# Patient Record
Sex: MALE | Race: OTHER
[De-identification: names, ages, dates, MRNs, and addresses within clinical notes are randomized per-mention and may not be internally consistent; named-entity substitution may affect disease eponyms.]

---

## 2019-10-03 ENCOUNTER — HOSPITAL ENCOUNTER (EMERGENCY)
Dept: HOSPITAL 56 - MW.ED | Age: 29
Discharge: HOME | End: 2019-10-03
Payer: COMMERCIAL

## 2019-10-03 DIAGNOSIS — L03.115: ICD-10-CM

## 2019-10-03 DIAGNOSIS — L03.116: Primary | ICD-10-CM

## 2019-10-03 PROCEDURE — 99283 EMERGENCY DEPT VISIT LOW MDM: CPT

## 2019-10-03 PROCEDURE — 82962 GLUCOSE BLOOD TEST: CPT

## 2019-10-03 PROCEDURE — 96372 THER/PROPH/DIAG INJ SC/IM: CPT

## 2019-10-03 NOTE — EDM.PDOC
ED HPI GENERAL MEDICAL PROBLEM





- General


Chief Complaint: Skin Complaint


Stated Complaint: RASH ON LEG


Time Seen by Provider: 10/03/19 15:01


Source of Information: Reports: Patient


History Limitations: Reports: No Limitations





- History of Present Illness


INITIAL COMMENTS - FREE TEXT/NARRATIVE: 





History of present illness:


[]Patient started having redness and tenderness on his right leg 4 days ago in 

clinic and started on Keflex yesterday. Today the redness worsened and he noted 

lesions on his left leg. Patient has no fevers, chills, chest pain or shortness 

of breath. She denies any trauma to his legs.





Review of systems: 


As per history of present illness and below otherwise all systems reviewed and 

negative.





Past medical history: 


As per history of present illness and as reviewed below otherwise 

noncontributory.





Surgical history: 


As per history of present illness and as reviewed below otherwise 

noncontributory.





Social history: 


No reported history of drug or alcohol abuse.





Family history: 


As per history of present illness and as reviewed below otherwise 

noncontributory.





Physical exam:


General: Well developed, well nourished in NAD


HEENT: Atraumatic, normocephalic, pupils reactive, negative for conjunctival 

pallor or scleral icterus, mucous membranes moist, throat clear, neck supple, 

nontender, trachea midline.


Lungs: Clear to auscultation, breath sounds equal bilaterally, chest nontender.


Heart: S1S2, regular, negative for clicks, rubs, or JVD.


Abdomen: NABS, Soft, nondistended, nontender. Negative for masses or 

hepatosplenomegaly. Negative for costovertebral tenderness.


Pelvis: Stable nontender.


Genitourinary: Deferred.


Rectal: Deferred.


Extremities: Atraumatic, positive erythema over medial right leg with small 

areas of erythema on the left medial leg. There are no open, weeping wounds. 

negative for cords or calf pain. Neurovascular unremarkable.


Neuro: Awake, alert, oriented. Cranial nerves II through XII unremarkable. 

Cerebellum unremarkable. Motor and sensory unremarkable throughout. Exam 

nonfocal.


Skin:warm and dry





Diagnostics:


None





Therapeutics:


Ceftriaxone IM





ED Course:


Stable





Impression: 


Cellulitis bilateral legs





Prescriptions:


Levaquin





Plan:


Follow-up with PMD, return if symptoms worsen or change





Definitive disposition and diagnosis as appropriate pending reevaluation and 

review of above.


  ** Bilateral Lower Leg


Pain Score (Numeric/FACES): 5





- Related Data


 Allergies











Allergy/AdvReac Type Severity Reaction Status Date / Time


 


No Known Allergies Allergy   Verified 10/03/19 15:00











Home Meds: 


 Home Meds





Cephalexin [Keflex] 500 mg PO ASDIRECTED 10/03/19 [History]


levoFLOXacin [Levaquin] 500 mg PO DAILY #14 tab 10/03/19 [Rx]











Past Medical History





- Past Health History


Medical/Surgical History: Denies Medical/Surgical History





- Infectious Disease History


Infectious Disease History: Reports: None





Social & Family History





- Family History


Family Medical History: Noncontributory





- Tobacco Use


Smoking Status *Q: Never Smoker


Second Hand Smoke Exposure: No





- Caffeine Use


Caffeine Use: Reports: Coffee





- Recreational Drug Use


Recreational Drug Use: No





ED ROS GENERAL





- Review of Systems


Review Of Systems: See Below





ED EXAM, SKIN/RASH


Exam: See Below





Course





- Vital Signs


Last Recorded V/S: 


 Last Vital Signs











Temp  97.7 F   10/03/19 16:25


 


Pulse  104 H  10/03/19 16:25


 


Resp  16   10/03/19 15:01


 


BP  154/89 H  10/03/19 16:25


 


Pulse Ox  99   10/03/19 16:25














- Orders/Labs/Meds


Orders: 


 Active Orders 24 hr











 Category Date Time Status


 


 Blood Glucose Check, Bedside [RC] ONETIME Care  10/03/19 16:00 Active











Labs: 


 Laboratory Tests











  10/03/19 Range/Units





  15:57 


 


POC Glucose  85  ()  mg/dL











Meds: 


Medications














Discontinued Medications














Generic Name Dose Route Start Last Admin





  Trade Name Freq  PRN Reason Stop Dose Admin


 


Ceftriaxone Sodium 1,000 mg/  1 mls @ 1 mls/sec  10/03/19 15:40  10/03/19 15:52





  Lidocaine HCl  IM  10/03/19 15:41  1 mls/sec





  ONETIME ONE   Administration





     





     





     





     














Departure





- Departure


Time of Disposition: 16:30


Disposition: Home, Self-Care 01


Clinical Impression: 


 Cellulitis








- Discharge Information


*PRESCRIPTION DRUG MONITORING PROGRAM REVIEWED*: No


*COPY OF PRESCRIPTION DRUG MONITORING REPORT IN PATIENT LUCERO: No


Prescriptions: 


levoFLOXacin [Levaquin] 500 mg PO DAILY #14 tab


Instructions:  Cellulitis, Adult, Easy-to-Read


Referrals: 


Adonis Sullivan MD [Resident] - 10/18/19 2:00 pm (Please check in 30 mins 

before apt. )


Forms:  ED Department Discharge


Additional Instructions: 


The following information is given to patients seen in the emergency department 

who are being discharged to home. This information is to outline your options 

for follow-up care. We provide all patients seen in our emergency department 

with a follow-up referral.





The need for follow-up, as well as the timing and circumstances, are variable 

depending upon the specifics of your emergency department visit.





If you don't have a primary care physician on staff, we will provide you with a 

referral. We always advise you to contact your personal physician following an 

emergency department visit to inform them of the circumstance of the visit and 

for follow-up with them and/or the need for any referrals to a consulting 

specialist.





The emergency department will also refer you to a specialist when appropriate. 

This referral assures that you have the opportunity for follow-up care with a 

specialist. All of these measure are taken in an effort to provide you with 

optimal care, which includes your follow-up.





Under all circumstances we always encourage you to contact your private 

physician who remains a resource for coordinating your care. When calling for 

follow-up care, please make the office aware that this follow-up is from your 

recent emergency room visit. If for any reason you are refused follow-up, 

please contact the [facility] Emergency 

Department at [phone] and asked to speak to the emergency department 

charge nurse.





Take meds as directed, follow up with your primary care physician, return to ER 

if symptoms worsen or change.





[facility]


Primary Care


[address] [place] [id number]


Phone: [phone]


Fax: [phone]





- My Orders


Last 24 Hours: 


My Active Orders





10/03/19 16:00


Blood Glucose Check, Bedside [RC] ONETIME 














- Assessment/Plan


Last 24 Hours: 


My Active Orders





10/03/19 16:00


Blood Glucose Check, Bedside [RC] ONETIME

## 2019-10-05 ENCOUNTER — HOSPITAL ENCOUNTER (OUTPATIENT)
Dept: HOSPITAL 56 - MW.ED | Age: 29
Setting detail: OBSERVATION
LOS: 2 days | Discharge: HOME | End: 2019-10-07
Attending: INTERNAL MEDICINE | Admitting: INTERNAL MEDICINE
Payer: COMMERCIAL

## 2019-10-05 DIAGNOSIS — F17.210: ICD-10-CM

## 2019-10-05 DIAGNOSIS — L03.116: Primary | ICD-10-CM

## 2019-10-05 DIAGNOSIS — E66.01: ICD-10-CM

## 2019-10-05 DIAGNOSIS — R73.03: ICD-10-CM

## 2019-10-05 DIAGNOSIS — D72.825: ICD-10-CM

## 2019-10-05 DIAGNOSIS — L03.115: ICD-10-CM

## 2019-10-05 DIAGNOSIS — G47.33: ICD-10-CM

## 2019-10-05 LAB
BUN SERPL-MCNC: 15 MG/DL (ref 7–18)
CHLORIDE SERPL-SCNC: 101 MMOL/L (ref 98–107)
CO2 SERPL-SCNC: 30.7 MMOL/L (ref 21–32)
GLUCOSE SERPL-MCNC: 104 MG/DL (ref 74–106)
POTASSIUM SERPL-SCNC: 3.8 MMOL/L (ref 3.5–5.1)
SODIUM SERPL-SCNC: 139 MMOL/L (ref 136–148)

## 2019-10-05 PROCEDURE — 96366 THER/PROPH/DIAG IV INF ADDON: CPT

## 2019-10-05 PROCEDURE — 87040 BLOOD CULTURE FOR BACTERIA: CPT

## 2019-10-05 PROCEDURE — 96372 THER/PROPH/DIAG INJ SC/IM: CPT

## 2019-10-05 PROCEDURE — 83880 ASSAY OF NATRIURETIC PEPTIDE: CPT

## 2019-10-05 PROCEDURE — 80053 COMPREHEN METABOLIC PANEL: CPT

## 2019-10-05 PROCEDURE — 99285 EMERGENCY DEPT VISIT HI MDM: CPT

## 2019-10-05 PROCEDURE — 96365 THER/PROPH/DIAG IV INF INIT: CPT

## 2019-10-05 PROCEDURE — 93005 ELECTROCARDIOGRAM TRACING: CPT

## 2019-10-05 PROCEDURE — 71045 X-RAY EXAM CHEST 1 VIEW: CPT

## 2019-10-05 PROCEDURE — 83036 HEMOGLOBIN GLYCOSYLATED A1C: CPT

## 2019-10-05 PROCEDURE — 83605 ASSAY OF LACTIC ACID: CPT

## 2019-10-05 PROCEDURE — 96376 TX/PRO/DX INJ SAME DRUG ADON: CPT

## 2019-10-05 PROCEDURE — 93970 EXTREMITY STUDY: CPT

## 2019-10-05 PROCEDURE — 96375 TX/PRO/DX INJ NEW DRUG ADDON: CPT

## 2019-10-05 PROCEDURE — G0378 HOSPITAL OBSERVATION PER HR: HCPCS

## 2019-10-05 PROCEDURE — 81003 URINALYSIS AUTO W/O SCOPE: CPT

## 2019-10-05 PROCEDURE — 85025 COMPLETE CBC W/AUTO DIFF WBC: CPT

## 2019-10-05 PROCEDURE — 82962 GLUCOSE BLOOD TEST: CPT

## 2019-10-05 PROCEDURE — 36415 COLL VENOUS BLD VENIPUNCTURE: CPT

## 2019-10-05 PROCEDURE — 80202 ASSAY OF VANCOMYCIN: CPT

## 2019-10-05 NOTE — EDM.PDOC
ED HPI GENERAL MEDICAL PROBLEM





- General


Chief Complaint: General


Stated Complaint: BOTH FEET SWOLLEN


Time Seen by Provider: 10/05/19 21:31





- History of Present Illness


INITIAL COMMENTS - FREE TEXT/NARRATIVE: 





HISTORY AND PHYSICAL:





History of present illness:


The patient is a 29-year-old male who was seen here 2 days ago for concern 

about cellulitis to his legs bilaterally and had been previously seen in our 

clinic and started on Keflex. On that evaluation the patient was changed to 

Levaquin for antibiotics and he says he has been compliant. He says that the 

redness has improved but he is concerned because now bilateral feet and lower 

legs are swollen. He says it is discomforting but he has no chest pain or 

shortness of breath no abdominal pain no urinary symptoms and no other new 

rashes or issues. He says he has not had a fever and no trauma to his legs. He 

says that he has not been doing a lot of walking on them but he is also not 

been elevating them. He has no weakness or neurosensory changes in his legs and 

feet.





Review of systems: 


As per history of present illness and below otherwise all systems reviewed and 

negative.





Past medical history: 


As per history of present illness and as reviewed below otherwise 

noncontributory.





Surgical history: 


As per history of present illness and as reviewed below otherwise 

noncontributory.





Social history: 


No reported history of drug or alcohol abuse.





Family history: 


As per history of present illness and as reviewed below otherwise 

noncontributory.





Physical exam:


General: Well-developed well-nourished very overweight man who is nontoxic and 

not breathless on my evaluation. Vital signs are noted by me.


HEENT: Atraumatic, normocephalic,  negative for conjunctival pallor or scleral 

icterus, mucous membranes moist, throat clear, neck supple, nontender, trachea 

midline.


Lungs: Clear to auscultation but diminished breath sounds in the bases 

bilaterally, no wheezing stridor or work of breathing, breath sounds equal 

bilaterally, chest nontender.


Heart: S1S2, regular, negative for clicks, rubs, or JVD.


Abdomen: Soft, nondistended, nontender. Abdomen is very rotund but there is no 

tenderness rebound or guarding and bowel sounds are hypoactive Negative for 

masses or hepatosplenomegaly. Negative for costovertebral tenderness.


Pelvis: Stable nontender.


Genitourinary: Deferred.


Rectal: Deferred.


Extremities: Atraumatic, negative for cords or calf pain. Neurovascular 

unremarkable. Legs are symmetrical and have diffuse soft tissue edema starting 

below the knee and extending to the toes and there is erythema bilaterally 

right greater than left without any skin lesions or breaks.


Neuro: Awake, alert, oriented. Cranial nerves II through XII unremarkable. 

Cerebellum unremarkable. Motor and sensory unremarkable throughout. Exam 

nonfocal.





Diagnostics:


EKG CBC CMP BNP UA with reflex chest x-ray bilateral lower extremity Dopplers 

blood cultures 2 lactic acid





Therapeutics:


Toradol vancomycin





I discussed all testing results with the patient and family at bedside and I 

have marked the redness on the legs bilaterally. I will give the patient a dose 

of vancomycin here and I have offered the patient observation admission as is a 

WBC count is elevated. I have offered the alternative of going home after the 

vancomycin and adding a second oral antibiotic to his regimen and have him come 

back in the next 12-24 hours for repeat blood work.





Impression: 


Persistent cellulitis bilateral lower extremities, leukocytosis





Definitive disposition and diagnosis as appropriate pending reevaluation and 

review of above.


  ** Left Foot


Pain Score (Numeric/FACES): 5





- Related Data


 Allergies











Allergy/AdvReac Type Severity Reaction Status Date / Time


 


No Known Allergies Allergy   Verified 10/05/19 21:26











Home Meds: 


 Home Meds





levoFLOXacin [Levaquin] 500 mg PO DAILY #14 tab 10/03/19 [Rx]











Past Medical History





- Past Health History


Medical/Surgical History: Denies Medical/Surgical History





- Infectious Disease History


Infectious Disease History: Reports: None





Social & Family History





- Family History


Family Medical History: Noncontributory





- Tobacco Use


Smoking Status *Q: Light Tobacco Smoker


Years of Tobacco use: 5


Packs/Tins Daily: 0.1





- Caffeine Use


Caffeine Use: Reports: None





- Recreational Drug Use


Recreational Drug Use: No





ED ROS GENERAL





- Review of Systems


Review Of Systems: ROS reveals no pertinent complaints other than HPI.





ED EXAM, GENERAL





- Physical Exam


Exam: See Below (See dictation)





Course





- Vital Signs


Last Recorded V/S: 


 Last Vital Signs











Temp  35.7 C   10/05/19 21:22


 


Pulse  108 H  10/05/19 22:46


 


Resp  20   10/05/19 22:46


 


BP  141/89 H  10/05/19 22:46


 


Pulse Ox  96   10/05/19 22:46














- Orders/Labs/Meds


Orders: 


 Active Orders 24 hr











 Category Date Time Status


 


 EKG Documentation Completion [RC] STAT Care  10/05/19 21:39 Active


 


 CULTURE BLOOD [BC] Stat Lab  10/05/19 22:52 Ordered


 


 CULTURE BLOOD [BC] Stat Lab  10/05/19 23:40 Received


 


 Sodium Chloride 0.9% [Saline Flush] Med  10/05/19 22:12 Active





 10 ml FLUSH ASDIRECTED PRN   


 


 Sodium Chloride 0.9% [Saline Flush] Med  10/05/19 22:12 Active





 2.5 ml FLUSH ASDIRECTED PRN   


 


 Vancomycin 1 gm Med  10/05/19 23:56 Ordered





 Sodium Chloride 0.9% [Normal Saline (AdvBag)] 250 ml   





 IV ONETIME   


 


 Blood Culture x2 Reflex Set [OM.PC] Stat Oth  10/05/19 22:52 Ordered


 


 Saline Lock Insert [OM.PC] Stat Oth  10/05/19 22:12 Ordered








 Medication Orders





Sodium Chloride (Saline Flush)  10 ml FLUSH ASDIRECTED PRN


   PRN Reason: Keep Vein Open


Sodium Chloride (Saline Flush)  2.5 ml FLUSH ASDIRECTED PRN


   PRN Reason: Keep Vein Open








Labs: 


 Laboratory Tests











  10/05/19 10/05/19 10/05/19 Range/Units





  22:05 22:05 22:05 


 


WBC  16.99 H    (4.0-11.0)  K/uL


 


RBC  5.11    (4.50-5.90)  M/uL


 


Hgb  13.3    (13.0-17.0)  g/dL


 


Hct  41.2    (38.0-50.0)  %


 


MCV  80.6    (80.0-98.0)  fL


 


MCH  26.0 L    (27.0-32.0)  pg


 


MCHC  32.3    (31.0-37.0)  g/dL


 


RDW Std Deviation  45.0    (28.0-62.0)  fl


 


RDW Coeff of Estephania  15    (11.0-15.0)  %


 


Plt Count  451 H    (150-400)  K/uL


 


MPV  9.80    (7.40-12.00)  fL


 


Add Manual Diff  YES    


 


Neutrophils % (Manual)  62    (48.0-80.0)  %


 


Lymphocytes % (Manual)  26    (16.0-40.0)  %


 


Monocytes % (Manual)  4    (0.0-15.0)  %


 


Eosinophils % (Manual)  7    (0.0-7.0)  %


 


Basophils % (Manual)  1    (0.0-1.5)  %


 


Nucleated RBC %  0.0    /100WBC


 


Absolute Seg Neuts  10.5 H    (1.4-5.7)  


 


Lymphocytes # (Manual)  4.4 H    (0.6-2.4)  


 


Monocytes # (Manual)  0.7    (0.0-0.8)  


 


Eosinophils # (Manual)  1.2 H    (0.0-0.7)  


 


Basophils # (Manual)  0.2 H    (0.0-0.1)  


 


Nucleated RBCs #  0    K/uL


 


Lactate     (0.20-2.00)  mmol/L


 


Sodium   139   (136-148)  mmol/L


 


Potassium   3.8   (3.5-5.1)  mmol/L


 


Chloride   101   ()  mmol/L


 


Carbon Dioxide   30.7   (21.0-32.0)  mmol/L


 


BUN   15   (7.0-18.0)  mg/dL


 


Creatinine   1.4 H   (0.8-1.3)  mg/dL


 


Est Cr Clr Drug Dosing   68.94   mL/min


 


Estimated GFR (MDRD)   > 60.0   ml/min


 


Glucose   104   ()  mg/dL


 


Calcium   9.2   (8.5-10.1)  mg/dL


 


Total Bilirubin   0.2   (0.2-1.0)  mg/dL


 


AST   26   (15-37)  IU/L


 


ALT   69 H   (14-63)  IU/L


 


Alkaline Phosphatase   111   ()  U/L


 


B-Natriuretic Peptide    < 2  (<100)  PG/ML


 


Total Protein   8.9 H   (6.4-8.2)  g/dL


 


Albumin   3.6   (3.4-5.0)  g/dL


 


Globulin   5.3 H   (2.6-4.0)  g/dL


 


Albumin/Globulin Ratio   0.7 L   (0.9-1.6)  


 


Urine Color     


 


Urine Appearance     


 


Urine pH     (5.0-8.0)  


 


Ur Specific Gravity     (1.001-1.035)  


 


Urine Protein     (NEGATIVE)  mg/dL


 


Urine Glucose (UA)     (NEGATIVE)  mg/dL


 


Urine Ketones     (NEGATIVE)  mg/dL


 


Urine Occult Blood     (NEGATIVE)  


 


Urine Nitrite     (NEGATIVE)  


 


Urine Bilirubin     (NEGATIVE)  


 


Urine Urobilinogen     (<2.0)  EU/dL


 


Ur Leukocyte Esterase     (NEGATIVE)  














  10/05/19 10/05/19 Range/Units





  22:06 23:40 


 


WBC    (4.0-11.0)  K/uL


 


RBC    (4.50-5.90)  M/uL


 


Hgb    (13.0-17.0)  g/dL


 


Hct    (38.0-50.0)  %


 


MCV    (80.0-98.0)  fL


 


MCH    (27.0-32.0)  pg


 


MCHC    (31.0-37.0)  g/dL


 


RDW Std Deviation    (28.0-62.0)  fl


 


RDW Coeff of Estephania    (11.0-15.0)  %


 


Plt Count    (150-400)  K/uL


 


MPV    (7.40-12.00)  fL


 


Add Manual Diff    


 


Neutrophils % (Manual)    (48.0-80.0)  %


 


Lymphocytes % (Manual)    (16.0-40.0)  %


 


Monocytes % (Manual)    (0.0-15.0)  %


 


Eosinophils % (Manual)    (0.0-7.0)  %


 


Basophils % (Manual)    (0.0-1.5)  %


 


Nucleated RBC %    /100WBC


 


Absolute Seg Neuts    (1.4-5.7)  


 


Lymphocytes # (Manual)    (0.6-2.4)  


 


Monocytes # (Manual)    (0.0-0.8)  


 


Eosinophils # (Manual)    (0.0-0.7)  


 


Basophils # (Manual)    (0.0-0.1)  


 


Nucleated RBCs #    K/uL


 


Lactate   1.7  (0.20-2.00)  mmol/L


 


Sodium    (136-148)  mmol/L


 


Potassium    (3.5-5.1)  mmol/L


 


Chloride    ()  mmol/L


 


Carbon Dioxide    (21.0-32.0)  mmol/L


 


BUN    (7.0-18.0)  mg/dL


 


Creatinine    (0.8-1.3)  mg/dL


 


Est Cr Clr Drug Dosing    mL/min


 


Estimated GFR (MDRD)    ml/min


 


Glucose    ()  mg/dL


 


Calcium    (8.5-10.1)  mg/dL


 


Total Bilirubin    (0.2-1.0)  mg/dL


 


AST    (15-37)  IU/L


 


ALT    (14-63)  IU/L


 


Alkaline Phosphatase    ()  U/L


 


B-Natriuretic Peptide    (<100)  PG/ML


 


Total Protein    (6.4-8.2)  g/dL


 


Albumin    (3.4-5.0)  g/dL


 


Globulin    (2.6-4.0)  g/dL


 


Albumin/Globulin Ratio    (0.9-1.6)  


 


Urine Color  YELLOW   


 


Urine Appearance  CLEAR   


 


Urine pH  6.0   (5.0-8.0)  


 


Ur Specific Gravity  1.015   (1.001-1.035)  


 


Urine Protein  NEGATIVE   (NEGATIVE)  mg/dL


 


Urine Glucose (UA)  NEGATIVE   (NEGATIVE)  mg/dL


 


Urine Ketones  NEGATIVE   (NEGATIVE)  mg/dL


 


Urine Occult Blood  NEGATIVE   (NEGATIVE)  


 


Urine Nitrite  NEGATIVE   (NEGATIVE)  


 


Urine Bilirubin  NEGATIVE   (NEGATIVE)  


 


Urine Urobilinogen  0.2   (<2.0)  EU/dL


 


Ur Leukocyte Esterase  NEGATIVE   (NEGATIVE)  











Meds: 


Medications











Generic Name Dose Route Start Last Admin





  Trade Name Freq  PRN Reason Stop Dose Admin


 


Sodium Chloride  10 ml  10/05/19 22:12  





  Saline Flush  FLUSH   





  ASDIRECTED PRN   





  Keep Vein Open   





     





     





     


 


Sodium Chloride  2.5 ml  10/05/19 22:12  





  Saline Flush  FLUSH   





  ASDIRECTED PRN   





  Keep Vein Open   





     





     





     














Discontinued Medications














Generic Name Dose Route Start Last Admin





  Trade Name Freq  PRN Reason Stop Dose Admin


 


Ketorolac Tromethamine  60 mg  10/05/19 21:40  10/05/19 22:31





  Toradol  IM  10/05/19 21:41  Not Given





  ONETIME ONE   





     





     





     





     


 


Ketorolac Tromethamine  30 mg  10/05/19 22:12  10/05/19 22:15





  Toradol  IVPUSH  10/05/19 22:13  30 mg





  ONETIME ONE   Administration





     





     





     





     














Departure





- Departure


Condition: Good


Clinical Impression: 


Cellulitis


Qualifiers:


 Site of cellulitis: extremity Site of cellulitis of extremity: lower extremity 

Laterality: unspecified laterality Qualified Code(s): L03.119 - Cellulitis of 

unspecified part of limb





Leukocytosis


Qualifiers:


 Leukocytosis type: unspecified Qualified Code(s): D72.829 - Elevated white 

blood cell count, unspecified








- Discharge Information


Referrals: 


PCP,None [Primary Care Provider] - 


Forms:  ED Department Discharge





- My Orders


Last 24 Hours: 


My Active Orders





10/05/19 21:39


EKG Documentation Completion [RC] STAT 





10/05/19 22:12


Sodium Chloride 0.9% [Saline Flush]   10 ml FLUSH ASDIRECTED PRN 


Sodium Chloride 0.9% [Saline Flush]   2.5 ml FLUSH ASDIRECTED PRN 


Saline Lock Insert [OM.PC] Stat 





10/05/19 22:52


CULTURE BLOOD [BC] Stat 


Blood Culture x2 Reflex Set [OM.PC] Stat 





10/05/19 23:40


CULTURE BLOOD [BC] Stat 





10/05/19 23:56


Vancomycin 1 gm   Sodium Chloride 0.9% [Normal Saline (AdvBag)] 250 ml IV 

ONETIME 














- Assessment/Plan


Last 24 Hours: 


My Active Orders





10/05/19 21:39


EKG Documentation Completion [RC] STAT 





10/05/19 22:12


Sodium Chloride 0.9% [Saline Flush]   10 ml FLUSH ASDIRECTED PRN 


Sodium Chloride 0.9% [Saline Flush]   2.5 ml FLUSH ASDIRECTED PRN 


Saline Lock Insert [OM.PC] Stat 





10/05/19 22:52


CULTURE BLOOD [BC] Stat 


Blood Culture x2 Reflex Set [OM.PC] Stat 





10/05/19 23:40


CULTURE BLOOD [BC] Stat 





10/05/19 23:56


Vancomycin 1 gm   Sodium Chloride 0.9% [Normal Saline (AdvBag)] 250 ml IV 

ONETIME

## 2019-10-05 NOTE — US
INDICATION:



Pain 



TECHNIQUE: :



Ultrasound venous duplex lower extremity bilateral.  Compression venous 

exam was performed using gray scale, color Doppler, and spectral Doppler 

imaging.



COMPARISON:



None  



FINDINGS:



Sonographic imaging demonstrates the common femoral, deep femoral, 

superficial femoral, popliteal and greater saphenous veins to be fully 

compressible with normal color Doppler blood flow in both lower 

extremities.  The posterior tibial veins were not visualized due to edema. 



IMPRESSION:



Normal bilateral lower extremity venous ultrasound, no sign of deep venous 

thrombosis. The posterior tibial veins were not visualized due to edema.



Dictated by Aaron Jarquin MD @ 10/5/2019 11:42:52 PM



Dictated by: Aaron Jarquin MD @ 10/05/2019 23:42:56



(Electronically Signed)

## 2019-10-05 NOTE — CR
INDICATION: Shortness of breath



TECHNIQUE:



Chest 1 view. 



COMPARISON:



None. 



FINDINGS:



Cardiovascular and mediastinum: Heart size and vasculature are normal in 

caliber and appearance.  Mediastinum is within normal limits.  



Lungs and pleural space: Lungs are clear.  No sign of infiltrate or mass.  

No sign of pleural effusion.  No pneumothorax.  



Bones and soft tissues: No significant findings.  



IMPRESSION:



Unremarkable chest.



Dictated by: Aaron Jarquin MD @ 10/05/2019 22:47:24



(Electronically Signed)

## 2019-10-06 LAB
BUN SERPL-MCNC: 20 MG/DL (ref 7–18)
CHLORIDE SERPL-SCNC: 104 MMOL/L (ref 98–107)
CO2 SERPL-SCNC: 25.3 MMOL/L (ref 21–32)
GLUCOSE SERPL-MCNC: 101 MG/DL (ref 74–106)
HBA1C BLD-MCNC: 6.4 % (ref 4.5–6.2)
POTASSIUM SERPL-SCNC: 4.3 MMOL/L (ref 3.5–5.1)
SODIUM SERPL-SCNC: 139 MMOL/L (ref 136–148)

## 2019-10-06 RX ADMIN — VANCOMYCIN SCH MLS/HR: 1.5 INJECTION, SOLUTION INTRAVENOUS at 17:08

## 2019-10-06 RX ADMIN — VANCOMYCIN SCH MLS/HR: 1.5 INJECTION, SOLUTION INTRAVENOUS at 09:05

## 2019-10-06 NOTE — EDM.PDOC
ED HPI GENERAL MEDICAL PROBLEM





- General


Chief Complaint: General


Stated Complaint: BOTH FEET SWOLLEN


Time Seen by Provider: 10/05/19 21:31





- History of Present Illness


INITIAL COMMENTS - FREE TEXT/NARRATIVE: 





HISTORY AND PHYSICAL:





History of present illness:


The patient is a healthy 29-year-old male who presents with persistent redness 

pain and swelling to bilateral lower extremities for which she has been taking 

antibiotics. The patient was seen in the clinic several days ago and placed on 

Keflex and then presented to the ED 2 days ago saying that the redness was not 

improving and lesions were hearing and he was switched to Levaquin. He states 

that he has been compliant with that antibiotic and the symptoms are not 

improving and he is having bilateral leg swelling now but he did not have 2 

days ago. The redness is still persistent but he is not noticing any new 

lesions or skin openings. He has no systemic complaints of fever chills nausea 

vomiting chest pain or shortness of breath and has no significant cardiac or 

pulmonary history. Eating and drinking normally.





Review of systems: 


As per history of present illness and below otherwise all systems reviewed and 

negative.





Past medical history: 


As per history of present illness and as reviewed below otherwise 

noncontributory.





Surgical history: 


As per history of present illness and as reviewed below otherwise 

noncontributory.





Social history: 


No reported history of drug or alcohol abuse.





Family history: 


As per history of present illness and as reviewed below otherwise 

noncontributory.





Physical exam:


General: Well-developed well-nourished overweight man who is nontoxic and vital 

signs are noted by me. His tachycardia was noted by me.


HEENT: Atraumatic, normocephalic, pupils reactive, negative for conjunctival 

pallor or scleral icterus, mucous membranes moist, throat clear, neck supple, 

nontender, trachea midline.


Lungs: Clear to auscultation, breath sounds equal bilaterally, chest nontender. 

No wheezing stridor or work of breathing


Heart: S1S2, regular rhythm and tachycardic rate of my evaluation but no overt 

murmurs


Abdomen: Soft, nondistended, nontender. Negative for masses or 

hepatosplenomegaly. Negative for costovertebral tenderness. Abdomen is very 

rotund and obese.


Pelvis: Stable nontender.


Genitourinary: Deferred.


Rectal: Deferred.


Extremities: Atraumatic, negative for cords or calf pain. Neurovascular 

unremarkable. Bilateral lower extremities have soft tissue diffuse swelling 

which is circumferential starting just distal to the knee extending to the toes 

and there is ill-defined erythema bilaterally right greater than left. I do not 

see any skin breaks or lesions and there is no crepitus with palpation of these 

areas. There is no streaking up the leg. Neurosensory and pulses are intact 

distally and there is only mild tenderness when I palpate these areas and there 

is definite warmth more on the right anterior leg than the left.


Neuro: Awake, alert, oriented. Cranial nerves II through XII unremarkable. 

Cerebellum unremarkable. Motor and sensory unremarkable throughout. Exam 

nonfocal.





Diagnostics:


CBC CMP BNP UA with reflex EKG chest x-ray bilateral venous Dopplers blood 

cultures 2 lactic acid





Therapeutics:


IV placement Toradol vancomycin





Throughout the course of the patient's ED stay his tachycardia has improved but 

is not completely resolved. His WBC count is elevated although his lactate is 

normal. I discussed testing results with the patient and significant other 

bedside and have offered observation admission as outpatient failure of 

cellulitis and he is agreeable. I discussed this case with Dr. Borrero at 0011 AM 

he accepts the patient for admission.





Impression: 


Bilateral lower extremity cellulitis persistent failed outpatient treatment





Definitive disposition and diagnosis as appropriate pending reevaluation and 

review of above.


  ** Left Foot


Pain Score (Numeric/FACES): 5





- Related Data


 Allergies











Allergy/AdvReac Type Severity Reaction Status Date / Time


 


No Known Allergies Allergy   Verified 10/05/19 21:26











Home Meds: 


 Home Meds





levoFLOXacin [Levaquin] 500 mg PO DAILY #14 tab 10/03/19 [Rx]











Past Medical History





- Past Health History


Medical/Surgical History: Denies Medical/Surgical History





- Infectious Disease History


Infectious Disease History: Reports: None





Social & Family History





- Family History


Family Medical History: Noncontributory





- Tobacco Use


Smoking Status *Q: Light Tobacco Smoker


Years of Tobacco use: 5


Packs/Tins Daily: 0.1





- Caffeine Use


Caffeine Use: Reports: None





- Recreational Drug Use


Recreational Drug Use: No





ED ROS GENERAL





- Review of Systems


Review Of Systems: ROS reveals no pertinent complaints other than HPI.





ED EXAM, GENERAL





- Physical Exam


Exam: See Below (See dictation)





Course





- Vital Signs


Last Recorded V/S: 





 Last Vital Signs











Temp  36.9 C   10/06/19 00:10


 


Pulse  102 H  10/06/19 00:10


 


Resp  20   10/06/19 00:10


 


BP  146/84 H  10/06/19 00:10


 


Pulse Ox  95   10/06/19 00:10














- Orders/Labs/Meds


Orders: 





 Active Orders 24 hr











 Category Date Time Status


 


 EKG Documentation Completion [RC] STAT Care  10/05/19 21:39 Active


 


 CULTURE BLOOD [BC] Stat Lab  10/05/19 22:52 Ordered


 


 CULTURE BLOOD [BC] Stat Lab  10/05/19 23:40 Received


 


 Sodium Chloride 0.9% [Saline Flush] Med  10/05/19 22:12 Active





 10 ml FLUSH ASDIRECTED PRN   


 


 Sodium Chloride 0.9% [Saline Flush] Med  10/05/19 22:12 Active





 2.5 ml FLUSH ASDIRECTED PRN   


 


 Vancomycin 1 gm Med  10/05/19 23:56 Active





 Sodium Chloride 0.9% [Normal Saline (AdvBag)] 250 ml   





 IV ONETIME   


 


 Blood Culture x2 Reflex Set [OM.PC] Stat Oth  10/05/19 22:52 Ordered


 


 Saline Lock Insert [OM.PC] Stat Oth  10/05/19 22:12 Ordered








 Medication Orders





Vancomycin HCl 1 gm/ Sodium (Chloride)  250 mls @ 166 mls/hr IV ONETIME ONE


   Stop: 10/06/19 01:26


   Last Admin: 10/06/19 00:11  Dose: 166 mls/hr


Sodium Chloride (Saline Flush)  10 ml FLUSH ASDIRECTED PRN


   PRN Reason: Keep Vein Open


Sodium Chloride (Saline Flush)  2.5 ml FLUSH ASDIRECTED PRN


   PRN Reason: Keep Vein Open








Labs: 





 Laboratory Tests











  10/05/19 10/05/19 10/05/19 Range/Units





  22:05 22:05 22:05 


 


WBC  16.99 H    (4.0-11.0)  K/uL


 


RBC  5.11    (4.50-5.90)  M/uL


 


Hgb  13.3    (13.0-17.0)  g/dL


 


Hct  41.2    (38.0-50.0)  %


 


MCV  80.6    (80.0-98.0)  fL


 


MCH  26.0 L    (27.0-32.0)  pg


 


MCHC  32.3    (31.0-37.0)  g/dL


 


RDW Std Deviation  45.0    (28.0-62.0)  fl


 


RDW Coeff of Estephania  15    (11.0-15.0)  %


 


Plt Count  451 H    (150-400)  K/uL


 


MPV  9.80    (7.40-12.00)  fL


 


Add Manual Diff  YES    


 


Neutrophils % (Manual)  62    (48.0-80.0)  %


 


Lymphocytes % (Manual)  26    (16.0-40.0)  %


 


Monocytes % (Manual)  4    (0.0-15.0)  %


 


Eosinophils % (Manual)  7    (0.0-7.0)  %


 


Basophils % (Manual)  1    (0.0-1.5)  %


 


Nucleated RBC %  0.0    /100WBC


 


Absolute Seg Neuts  10.5 H    (1.4-5.7)  


 


Lymphocytes # (Manual)  4.4 H    (0.6-2.4)  


 


Monocytes # (Manual)  0.7    (0.0-0.8)  


 


Eosinophils # (Manual)  1.2 H    (0.0-0.7)  


 


Basophils # (Manual)  0.2 H    (0.0-0.1)  


 


Nucleated RBCs #  0    K/uL


 


Lactate     (0.20-2.00)  mmol/L


 


Sodium   139   (136-148)  mmol/L


 


Potassium   3.8   (3.5-5.1)  mmol/L


 


Chloride   101   ()  mmol/L


 


Carbon Dioxide   30.7   (21.0-32.0)  mmol/L


 


BUN   15   (7.0-18.0)  mg/dL


 


Creatinine   1.4 H   (0.8-1.3)  mg/dL


 


Est Cr Clr Drug Dosing   68.94   mL/min


 


Estimated GFR (MDRD)   > 60.0   ml/min


 


Glucose   104   ()  mg/dL


 


Calcium   9.2   (8.5-10.1)  mg/dL


 


Total Bilirubin   0.2   (0.2-1.0)  mg/dL


 


AST   26   (15-37)  IU/L


 


ALT   69 H   (14-63)  IU/L


 


Alkaline Phosphatase   111   ()  U/L


 


B-Natriuretic Peptide    < 2  (<100)  PG/ML


 


Total Protein   8.9 H   (6.4-8.2)  g/dL


 


Albumin   3.6   (3.4-5.0)  g/dL


 


Globulin   5.3 H   (2.6-4.0)  g/dL


 


Albumin/Globulin Ratio   0.7 L   (0.9-1.6)  


 


Urine Color     


 


Urine Appearance     


 


Urine pH     (5.0-8.0)  


 


Ur Specific Gravity     (1.001-1.035)  


 


Urine Protein     (NEGATIVE)  mg/dL


 


Urine Glucose (UA)     (NEGATIVE)  mg/dL


 


Urine Ketones     (NEGATIVE)  mg/dL


 


Urine Occult Blood     (NEGATIVE)  


 


Urine Nitrite     (NEGATIVE)  


 


Urine Bilirubin     (NEGATIVE)  


 


Urine Urobilinogen     (<2.0)  EU/dL


 


Ur Leukocyte Esterase     (NEGATIVE)  














  10/05/19 10/05/19 Range/Units





  22:06 23:40 


 


WBC    (4.0-11.0)  K/uL


 


RBC    (4.50-5.90)  M/uL


 


Hgb    (13.0-17.0)  g/dL


 


Hct    (38.0-50.0)  %


 


MCV    (80.0-98.0)  fL


 


MCH    (27.0-32.0)  pg


 


MCHC    (31.0-37.0)  g/dL


 


RDW Std Deviation    (28.0-62.0)  fl


 


RDW Coeff of Estephania    (11.0-15.0)  %


 


Plt Count    (150-400)  K/uL


 


MPV    (7.40-12.00)  fL


 


Add Manual Diff    


 


Neutrophils % (Manual)    (48.0-80.0)  %


 


Lymphocytes % (Manual)    (16.0-40.0)  %


 


Monocytes % (Manual)    (0.0-15.0)  %


 


Eosinophils % (Manual)    (0.0-7.0)  %


 


Basophils % (Manual)    (0.0-1.5)  %


 


Nucleated RBC %    /100WBC


 


Absolute Seg Neuts    (1.4-5.7)  


 


Lymphocytes # (Manual)    (0.6-2.4)  


 


Monocytes # (Manual)    (0.0-0.8)  


 


Eosinophils # (Manual)    (0.0-0.7)  


 


Basophils # (Manual)    (0.0-0.1)  


 


Nucleated RBCs #    K/uL


 


Lactate   1.7  (0.20-2.00)  mmol/L


 


Sodium    (136-148)  mmol/L


 


Potassium    (3.5-5.1)  mmol/L


 


Chloride    ()  mmol/L


 


Carbon Dioxide    (21.0-32.0)  mmol/L


 


BUN    (7.0-18.0)  mg/dL


 


Creatinine    (0.8-1.3)  mg/dL


 


Est Cr Clr Drug Dosing    mL/min


 


Estimated GFR (MDRD)    ml/min


 


Glucose    ()  mg/dL


 


Calcium    (8.5-10.1)  mg/dL


 


Total Bilirubin    (0.2-1.0)  mg/dL


 


AST    (15-37)  IU/L


 


ALT    (14-63)  IU/L


 


Alkaline Phosphatase    ()  U/L


 


B-Natriuretic Peptide    (<100)  PG/ML


 


Total Protein    (6.4-8.2)  g/dL


 


Albumin    (3.4-5.0)  g/dL


 


Globulin    (2.6-4.0)  g/dL


 


Albumin/Globulin Ratio    (0.9-1.6)  


 


Urine Color  YELLOW   


 


Urine Appearance  CLEAR   


 


Urine pH  6.0   (5.0-8.0)  


 


Ur Specific Gravity  1.015   (1.001-1.035)  


 


Urine Protein  NEGATIVE   (NEGATIVE)  mg/dL


 


Urine Glucose (UA)  NEGATIVE   (NEGATIVE)  mg/dL


 


Urine Ketones  NEGATIVE   (NEGATIVE)  mg/dL


 


Urine Occult Blood  NEGATIVE   (NEGATIVE)  


 


Urine Nitrite  NEGATIVE   (NEGATIVE)  


 


Urine Bilirubin  NEGATIVE   (NEGATIVE)  


 


Urine Urobilinogen  0.2   (<2.0)  EU/dL


 


Ur Leukocyte Esterase  NEGATIVE   (NEGATIVE)  











Meds: 





Medications











Generic Name Dose Route Start Last Admin





  Trade Name Freq  PRN Reason Stop Dose Admin


 


Vancomycin HCl 1 gm/ Sodium  250 mls @ 166 mls/hr  10/05/19 23:56  10/06/19 00:

11





  Chloride  IV  10/06/19 01:26  166 mls/hr





  ONETIME ONE   Administration





     





     





     





     


 


Sodium Chloride  10 ml  10/05/19 22:12  





  Saline Flush  FLUSH   





  ASDIRECTED PRN   





  Keep Vein Open   





     





     





     


 


Sodium Chloride  2.5 ml  10/05/19 22:12  





  Saline Flush  FLUSH   





  ASDIRECTED PRN   





  Keep Vein Open   





     





     





     














Discontinued Medications














Generic Name Dose Route Start Last Admin





  Trade Name Freq  PRN Reason Stop Dose Admin


 


Ketorolac Tromethamine  60 mg  10/05/19 21:40  10/05/19 22:31





  Toradol  IM  10/05/19 21:41  Not Given





  ONETIME ONE   





     





     





     





     


 


Ketorolac Tromethamine  30 mg  10/05/19 22:12  10/05/19 22:15





  Toradol  IVPUSH  10/05/19 22:13  30 mg





  ONETIME ONE   Administration





     





     





     





     














Departure





- Departure


Time of Disposition: 00:18


Disposition: Refer to Observation


Condition: Good


Clinical Impression: 


Cellulitis


Qualifiers:


 Site of cellulitis: extremity Site of cellulitis of extremity: lower extremity 

Laterality: unspecified laterality Qualified Code(s): L03.119 - Cellulitis of 

unspecified part of limb





Leukocytosis


Qualifiers:


 Leukocytosis type: unspecified Qualified Code(s): D72.829 - Elevated white 

blood cell count, unspecified








- Discharge Information


Referrals: 


PCP,None [Primary Care Provider] - 


Forms:  ED Department Discharge





- My Orders


Last 24 Hours: 





My Active Orders





10/05/19 21:39


EKG Documentation Completion [RC] STAT 





10/05/19 22:12


Sodium Chloride 0.9% [Saline Flush]   10 ml FLUSH ASDIRECTED PRN 


Sodium Chloride 0.9% [Saline Flush]   2.5 ml FLUSH ASDIRECTED PRN 


Saline Lock Insert [OM.PC] Stat 





10/05/19 22:52


CULTURE BLOOD [BC] Stat 


Blood Culture x2 Reflex Set [OM.PC] Stat 





10/05/19 23:40


CULTURE BLOOD [BC] Stat 





10/05/19 23:56


Vancomycin 1 gm   Sodium Chloride 0.9% [Normal Saline (AdvBag)] 250 ml IV 

ONETIME 














- Assessment/Plan


Last 24 Hours: 





My Active Orders





10/05/19 21:39


EKG Documentation Completion [RC] STAT 





10/05/19 22:12


Sodium Chloride 0.9% [Saline Flush]   10 ml FLUSH ASDIRECTED PRN 


Sodium Chloride 0.9% [Saline Flush]   2.5 ml FLUSH ASDIRECTED PRN 


Saline Lock Insert [OM.PC] Stat 





10/05/19 22:52


CULTURE BLOOD [BC] Stat 


Blood Culture x2 Reflex Set [OM.PC] Stat 





10/05/19 23:40


CULTURE BLOOD [BC] Stat 





10/05/19 23:56


Vancomycin 1 gm   Sodium Chloride 0.9% [Normal Saline (AdvBag)] 250 ml IV 

ONETIME

## 2019-10-06 NOTE — PCM.HP.2
H&P History of Present Illness





- General


Date of Service: 10/06/19


Admit Problem/Dx: 


 Admission Diagnosis/Problem





Admission Diagnosis/Problem      Cellulitis, bilateral lower extremities, 

morbid obesity, lower extremity edema








Source of Information: Patient


History Limitations: Reports: No Limitations





- History of Present Illness


Initial Comments - Free Text/Narative: 





The patient is a 29-year-old gentleman who had presented to the emergency 

department 2 days ago and had been started on Keflex for lower extremity 

cellulitis. Patient said that he has not been improving and was admitted to 

acute hospitalization on October 5, 2019. The patient reportedly had increasing 

swelling of both of his lower extremities. He had increasing redness and 

erythema. The patient had denied any fever or chills. He's had no nausea or 

vomiting. Patient has been in his usual state of health and has not been taking 

medications chronically. The patient does not have a primary care physician.


Onset of Symptoms: Reports: Sudden


Duration of Symptoms: Reports: Day(s):


Location: Reports: Lower Extremity, Left, Lower Extremity, Right


Quality: Reports: Ache, Stabbing, Throbbing


Severity: Moderate


Improves with: Reports: Rest


Worsens with: Reports: Movement


Context: Reports: Other (Failed outpatient treatment)


Associated Symptoms: Reports: No Other Symptoms


  ** Left Foot


Pain Score (Numeric/FACES): 1





  ** Bilateral Lower Feet


Pain Score (Numeric/FACES): 0





- Related Data


Allergies/Adverse Reactions: 


 Allergies











Allergy/AdvReac Type Severity Reaction Status Date / Time


 


No Known Allergies Allergy   Verified 10/06/19 01:32











Home Medications: 


 Home Meds





levoFLOXacin [Levaquin] 500 mg PO DAILY #14 tab 10/03/19 [Rx]











Past Medical History





- Past Health History


Medical/Surgical History: Denies Medical/Surgical History


HEENT History: Reports: None


Cardiovascular History: Reports: None


Respiratory History: Reports: Sleep Apnea


Gastrointestinal History: Reports: None


Genitourinary History: Reports: None


Musculoskeletal History: Reports: None


Neurological History: Reports: None


Psychiatric History: Reports: None


Endocrine/Metabolic History: Reports: Obesity/BMI 30+


Hematologic History: Reports: None


Immunologic History: Reports: None


Oncologic (Cancer) History: Reports: None


Dermatologic History: Reports: Cellulitis


Other Dermatologic History: First event of Cellulitis 10/2019





- Infectious Disease History


Infectious Disease History: Reports: None





- Past Surgical History


Cardiovascular Surgical History: Reports: None





Social & Family History





- Family History


Respiratory: Reports: Sleep Apnea


Endocrine/Metabolic: Reports: Diabetes, type II





- Tobacco Use


Smoking Status *Q: Current Some Day Smoker


Years of Tobacco use: 5


Packs/Tins Daily: 0.2


Used Tobacco, but Quit: No


Second Hand Smoke Exposure: No





- Caffeine Use


Caffeine Use: Reports: Coffee, Energy Drinks





- Alcohol Use


Days Per Week of Alcohol Use: 1


Number of Drinks Per Day: 4


Total Drinks Per Week: 4


Date of Last Drink: 10/03/19





- Recreational Drug Use


Recreational Drug Use: No





- Living Situation & Occupation


Living situation: Reports: with Significant Other


Occupation: Employed





H&P Review of Systems





- Review of Systems:


Review Of Systems: See Below


General: Reports: Weakness


HEENT: Reports: No Symptoms


Pulmonary: Reports: Other


Cardiovascular: Reports: No Symptoms


Gastrointestinal: Reports: No Symptoms


Genitourinary: Reports: No Symptoms


Musculoskeletal: Reports: Leg Pain (Bilateral lower extremity)


Skin: Reports: Other (Erythema, edema bilateral lower extremity)


Psychiatric: Reports: No Symptoms


Neurological: Reports: No Symptoms


Hematologic/Lymphatic: Reports: No Symptoms


Immunologic: Reports: No Symptoms





Exam





- Exam


Exam: See Below





- Vital Signs


Vital Signs: 


 Last Vital Signs











Temp  36.8 C   10/06/19 07:19


 


Pulse  97   10/06/19 07:19


 


Resp  18   10/06/19 07:19


 


BP  135/68   10/06/19 07:19


 


Pulse Ox  95   10/06/19 07:19











Weight: 151.817 kg





- Exam


Quality Assessment: No: Supplemental Oxygen


General: Alert, Oriented, Cooperative, Other (Heavy sonorous breathing, 

episodes of obstructive sleep apnea observed)


HEENT: Conjunctiva Clear, EACs Clear, EOMI, Pupils Equal, PERRLA.  No: Mucosa 

Moist & Pink (Dry)


Neck: Supple, Trachea Midline


Lungs: Clear to Auscultation, Normal Respiratory Effort


Cardiovascular: Regular Rate, Regular Rhythm


GI/Abdominal Exam: Normal Bowel Sounds, Soft (Not able to conduct detailed 

examination secondary to the patient's body habitus), No Distention, Other


Back Exam: Normal Inspection, Full Range of Motion


Extremities: Pedal Edema, Redness


Skin: Warm, Dry, Intact


Neurological: Cranial Nerves Intact


Neuro Extensive - Mental Status: Alert, Oriented x3


Neuro Extensive - Motor, Sensory, Reflexes: CN II-XII Intact


Psychiatric: Alert, Normal Affect





- Patient Data


Lab Results Last 24 hrs: 


 Laboratory Results - last 24 hr











  10/05/19 10/05/19 10/05/19 Range/Units





  22:05 22:05 22:05 


 


WBC  16.99 H    (4.0-11.0)  K/uL


 


RBC  5.11    (4.50-5.90)  M/uL


 


Hgb  13.3    (13.0-17.0)  g/dL


 


Hct  41.2    (38.0-50.0)  %


 


MCV  80.6    (80.0-98.0)  fL


 


MCH  26.0 L    (27.0-32.0)  pg


 


MCHC  32.3    (31.0-37.0)  g/dL


 


RDW Std Deviation  45.0    (28.0-62.0)  fl


 


RDW Coeff of Estephania  15    (11.0-15.0)  %


 


Plt Count  451 H    (150-400)  K/uL


 


MPV  9.80    (7.40-12.00)  fL


 


Neut % (Auto)     (48.0-80.0)  %


 


Lymph % (Auto)     (16.0-40.0)  %


 


Mono % (Auto)     (0.0-15.0)  %


 


Eos % (Auto)     (0.0-7.0)  %


 


Baso % (Auto)     (0.0-1.5)  %


 


Neut # (Auto)     (1.4-5.7)  K/uL


 


Lymph # (Auto)     (0.6-2.4)  K/uL


 


Mono # (Auto)     (0.0-0.8)  K/uL


 


Eos # (Auto)     (0.0-0.7)  K/uL


 


Baso # (Auto)     (0.0-0.1)  K/uL


 


Add Manual Diff  YES    


 


Neutrophils % (Manual)  62    (48.0-80.0)  %


 


Lymphocytes % (Manual)  26    (16.0-40.0)  %


 


Monocytes % (Manual)  4    (0.0-15.0)  %


 


Eosinophils % (Manual)  7    (0.0-7.0)  %


 


Basophils % (Manual)  1    (0.0-1.5)  %


 


Nucleated RBC %  0.0    /100WBC


 


Absolute Seg Neuts  10.5 H    (1.4-5.7)  


 


Lymphocytes # (Manual)  4.4 H    (0.6-2.4)  


 


Monocytes # (Manual)  0.7    (0.0-0.8)  


 


Eosinophils # (Manual)  1.2 H    (0.0-0.7)  


 


Basophils # (Manual)  0.2 H    (0.0-0.1)  


 


Nucleated RBCs #  0    K/uL


 


Lactate     (0.20-2.00)  mmol/L


 


Sodium   139   (136-148)  mmol/L


 


Potassium   3.8   (3.5-5.1)  mmol/L


 


Chloride   101   ()  mmol/L


 


Carbon Dioxide   30.7   (21.0-32.0)  mmol/L


 


BUN   15   (7.0-18.0)  mg/dL


 


Creatinine   1.4 H   (0.8-1.3)  mg/dL


 


Est Cr Clr Drug Dosing   68.94   mL/min


 


Estimated GFR (MDRD)   > 60.0   ml/min


 


Glucose   104   ()  mg/dL


 


Hemoglobin A1c     (4.5-6.2)  %


 


Calcium   9.2   (8.5-10.1)  mg/dL


 


Total Bilirubin   0.2   (0.2-1.0)  mg/dL


 


AST   26   (15-37)  IU/L


 


ALT   69 H   (14-63)  IU/L


 


Alkaline Phosphatase   111   ()  U/L


 


B-Natriuretic Peptide    < 2  (<100)  PG/ML


 


Total Protein   8.9 H   (6.4-8.2)  g/dL


 


Albumin   3.6   (3.4-5.0)  g/dL


 


Globulin   5.3 H   (2.6-4.0)  g/dL


 


Albumin/Globulin Ratio   0.7 L   (0.9-1.6)  


 


Urine Color     


 


Urine Appearance     


 


Urine pH     (5.0-8.0)  


 


Ur Specific Gravity     (1.001-1.035)  


 


Urine Protein     (NEGATIVE)  mg/dL


 


Urine Glucose (UA)     (NEGATIVE)  mg/dL


 


Urine Ketones     (NEGATIVE)  mg/dL


 


Urine Occult Blood     (NEGATIVE)  


 


Urine Nitrite     (NEGATIVE)  


 


Urine Bilirubin     (NEGATIVE)  


 


Urine Urobilinogen     (<2.0)  EU/dL


 


Ur Leukocyte Esterase     (NEGATIVE)  














  10/05/19 10/05/19 10/06/19 Range/Units





  22:06 23:40 05:10 


 


WBC     (4.0-11.0)  K/uL


 


RBC     (4.50-5.90)  M/uL


 


Hgb     (13.0-17.0)  g/dL


 


Hct     (38.0-50.0)  %


 


MCV     (80.0-98.0)  fL


 


MCH     (27.0-32.0)  pg


 


MCHC     (31.0-37.0)  g/dL


 


RDW Std Deviation     (28.0-62.0)  fl


 


RDW Coeff of Estephania     (11.0-15.0)  %


 


Plt Count     (150-400)  K/uL


 


MPV     (7.40-12.00)  fL


 


Neut % (Auto)     (48.0-80.0)  %


 


Lymph % (Auto)     (16.0-40.0)  %


 


Mono % (Auto)     (0.0-15.0)  %


 


Eos % (Auto)     (0.0-7.0)  %


 


Baso % (Auto)     (0.0-1.5)  %


 


Neut # (Auto)     (1.4-5.7)  K/uL


 


Lymph # (Auto)     (0.6-2.4)  K/uL


 


Mono # (Auto)     (0.0-0.8)  K/uL


 


Eos # (Auto)     (0.0-0.7)  K/uL


 


Baso # (Auto)     (0.0-0.1)  K/uL


 


Add Manual Diff     


 


Neutrophils % (Manual)     (48.0-80.0)  %


 


Lymphocytes % (Manual)     (16.0-40.0)  %


 


Monocytes % (Manual)     (0.0-15.0)  %


 


Eosinophils % (Manual)     (0.0-7.0)  %


 


Basophils % (Manual)     (0.0-1.5)  %


 


Nucleated RBC %     /100WBC


 


Absolute Seg Neuts     (1.4-5.7)  


 


Lymphocytes # (Manual)     (0.6-2.4)  


 


Monocytes # (Manual)     (0.0-0.8)  


 


Eosinophils # (Manual)     (0.0-0.7)  


 


Basophils # (Manual)     (0.0-0.1)  


 


Nucleated RBCs #     K/uL


 


Lactate   1.7   (0.20-2.00)  mmol/L


 


Sodium     (136-148)  mmol/L


 


Potassium     (3.5-5.1)  mmol/L


 


Chloride     ()  mmol/L


 


Carbon Dioxide     (21.0-32.0)  mmol/L


 


BUN     (7.0-18.0)  mg/dL


 


Creatinine     (0.8-1.3)  mg/dL


 


Est Cr Clr Drug Dosing     mL/min


 


Estimated GFR (MDRD)     ml/min


 


Glucose     ()  mg/dL


 


Hemoglobin A1c    6.4 H  (4.5-6.2)  %


 


Calcium     (8.5-10.1)  mg/dL


 


Total Bilirubin     (0.2-1.0)  mg/dL


 


AST     (15-37)  IU/L


 


ALT     (14-63)  IU/L


 


Alkaline Phosphatase     ()  U/L


 


B-Natriuretic Peptide     (<100)  PG/ML


 


Total Protein     (6.4-8.2)  g/dL


 


Albumin     (3.4-5.0)  g/dL


 


Globulin     (2.6-4.0)  g/dL


 


Albumin/Globulin Ratio     (0.9-1.6)  


 


Urine Color  YELLOW    


 


Urine Appearance  CLEAR    


 


Urine pH  6.0    (5.0-8.0)  


 


Ur Specific Gravity  1.015    (1.001-1.035)  


 


Urine Protein  NEGATIVE    (NEGATIVE)  mg/dL


 


Urine Glucose (UA)  NEGATIVE    (NEGATIVE)  mg/dL


 


Urine Ketones  NEGATIVE    (NEGATIVE)  mg/dL


 


Urine Occult Blood  NEGATIVE    (NEGATIVE)  


 


Urine Nitrite  NEGATIVE    (NEGATIVE)  


 


Urine Bilirubin  NEGATIVE    (NEGATIVE)  


 


Urine Urobilinogen  0.2    (<2.0)  EU/dL


 


Ur Leukocyte Esterase  NEGATIVE    (NEGATIVE)  














  10/06/19 10/06/19 Range/Units





  05:10 05:10 


 


WBC  16.85 H   (4.0-11.0)  K/uL


 


RBC  4.79   (4.50-5.90)  M/uL


 


Hgb  12.5 L   (13.0-17.0)  g/dL


 


Hct  38.8   (38.0-50.0)  %


 


MCV  81.0   (80.0-98.0)  fL


 


MCH  26.1 L   (27.0-32.0)  pg


 


MCHC  32.2   (31.0-37.0)  g/dL


 


RDW Std Deviation  45.8   (28.0-62.0)  fl


 


RDW Coeff of Estephania  15   (11.0-15.0)  %


 


Plt Count  362   (150-400)  K/uL


 


MPV  11.00   (7.40-12.00)  fL


 


Neut % (Auto)  61.6   (48.0-80.0)  %


 


Lymph % (Auto)  27.2   (16.0-40.0)  %


 


Mono % (Auto)  8.2   (0.0-15.0)  %


 


Eos % (Auto)  2.8   (0.0-7.0)  %


 


Baso % (Auto)  0.2   (0.0-1.5)  %


 


Neut # (Auto)  10.4 H   (1.4-5.7)  K/uL


 


Lymph # (Auto)  4.6 H   (0.6-2.4)  K/uL


 


Mono # (Auto)  1.4 H   (0.0-0.8)  K/uL


 


Eos # (Auto)  0.5   (0.0-0.7)  K/uL


 


Baso # (Auto)  0.0   (0.0-0.1)  K/uL


 


Add Manual Diff    


 


Neutrophils % (Manual)    (48.0-80.0)  %


 


Lymphocytes % (Manual)    (16.0-40.0)  %


 


Monocytes % (Manual)    (0.0-15.0)  %


 


Eosinophils % (Manual)    (0.0-7.0)  %


 


Basophils % (Manual)    (0.0-1.5)  %


 


Nucleated RBC %  0.0   /100WBC


 


Absolute Seg Neuts    (1.4-5.7)  


 


Lymphocytes # (Manual)    (0.6-2.4)  


 


Monocytes # (Manual)    (0.0-0.8)  


 


Eosinophils # (Manual)    (0.0-0.7)  


 


Basophils # (Manual)    (0.0-0.1)  


 


Nucleated RBCs #  0   K/uL


 


Lactate    (0.20-2.00)  mmol/L


 


Sodium   139  (136-148)  mmol/L


 


Potassium   4.3  (3.5-5.1)  mmol/L


 


Chloride   104  ()  mmol/L


 


Carbon Dioxide   25.3  (21.0-32.0)  mmol/L


 


BUN   20 H  (7.0-18.0)  mg/dL


 


Creatinine   1.0  (0.8-1.3)  mg/dL


 


Est Cr Clr Drug Dosing   92.91  mL/min


 


Estimated GFR (MDRD)   > 60.0  ml/min


 


Glucose   101  ()  mg/dL


 


Hemoglobin A1c    (4.5-6.2)  %


 


Calcium   8.5  (8.5-10.1)  mg/dL


 


Total Bilirubin   0.2  (0.2-1.0)  mg/dL


 


AST   28  (15-37)  IU/L


 


ALT   57  (14-63)  IU/L


 


Alkaline Phosphatase   93  ()  U/L


 


B-Natriuretic Peptide    (<100)  PG/ML


 


Total Protein   7.8  (6.4-8.2)  g/dL


 


Albumin   3.1 L  (3.4-5.0)  g/dL


 


Globulin   4.7 H  (2.6-4.0)  g/dL


 


Albumin/Globulin Ratio   0.7 L  (0.9-1.6)  


 


Urine Color    


 


Urine Appearance    


 


Urine pH    (5.0-8.0)  


 


Ur Specific Gravity    (1.001-1.035)  


 


Urine Protein    (NEGATIVE)  mg/dL


 


Urine Glucose (UA)    (NEGATIVE)  mg/dL


 


Urine Ketones    (NEGATIVE)  mg/dL


 


Urine Occult Blood    (NEGATIVE)  


 


Urine Nitrite    (NEGATIVE)  


 


Urine Bilirubin    (NEGATIVE)  


 


Urine Urobilinogen    (<2.0)  EU/dL


 


Ur Leukocyte Esterase    (NEGATIVE)  











Result Diagrams: 


 10/06/19 05:10





 10/06/19 05:10





- Problem List


(1) Cellulitis


SNOMED Code(s): 632244567


   ICD Code: L03.90 - CELLULITIS, UNSPECIFIED   Status: Acute   Priority: High 

  Current Visit: Yes   


Qualifiers: 


   Site of cellulitis: extremity   Site of cellulitis of extremity: lower 

extremity   Laterality: unspecified laterality   Qualified Code(s): L03.119 - 

Cellulitis of unspecified part of limb   





(2) Leukocytosis


SNOMED Code(s): 929686839, 405810474


   ICD Code: D72.829 - ELEVATED WHITE BLOOD CELL COUNT, UNSPECIFIED   Status: 

Acute   Priority: High   Current Visit: Yes   


Qualifiers: 


   Leukocytosis type: bandemia   Qualified Code(s): D72.825 - Bandemia   





(3) Morbid obesity with BMI of 50.0-59.9, adult


SNOMED Code(s): 108712974, 37411610310240


   ICD Code: E66.01 - MORBID (SEVERE) OBESITY DUE TO EXCESS CALORIES; Z68.43 - 

BODY MASS INDEX (BMI) 50.0-59.9, ADULT   Status: Acute   Priority: High   

Current Visit: Yes   





(4) Obstructive sleep apnea of adult


SNOMED Code(s): 4428249106066


   ICD Code: G47.33 - OBSTRUCTIVE SLEEP APNEA (ADULT) (PEDIATRIC)   Status: 

Chronic   Priority: High   Current Visit: Yes   





(5) Pre-diabetes


SNOMED Code(s): 273797187


   ICD Code: R73.03 - PREDIABETES   Status: Acute   Priority: High   Current 

Visit: Yes   


Problem List Initiated/Reviewed/Updated: Yes


Orders Last 24hrs: 


 Active Orders 24 hr











 Category Date Time Status


 


 Patient Status [ADT] Stat ADT  10/06/19 00:18 Active


 


 Regular Diet [DIET] Diet  10/06/19 Breakfast Active


 


 CULTURE BLOOD [BC] Stat Lab  10/05/19 22:52 Received


 


 CULTURE BLOOD [BC] Stat Lab  10/05/19 23:40 Received


 


 VANCOMYCIN TROUGH [CHEM] Timed Lab  10/07/19 09:00 Ordered


 


 Acetaminophen [Tylenol] Med  10/06/19 01:25 Active





 650 mg PO Q6H PRN   


 


 Pharmacy to Dose - Vancomycin Med  10/06/19 01:30 Pending





 1 dose .XX ASDIRECTED   


 


 Sodium Chloride 0.9% [Normal Saline] 1,000 ml Med  10/06/19 01:30 Active





 IV ASDIRECTED   


 


 Sodium Chloride 0.9% [Saline Flush] Med  10/05/19 22:12 Active





 10 ml FLUSH ASDIRECTED PRN   


 


 Sodium Chloride 0.9% [Saline Flush] Med  10/05/19 22:12 Active





 2.5 ml FLUSH ASDIRECTED PRN   


 


 Vancomycin/Water For Inj (Peg) [Vancomycin 1.5 GM/300 Med  10/06/19 10:00 

Active





 ML Bag] 300 ml   





 IV Q8H   


 


 oxyCODONE Med  10/06/19 01:25 Active





 5 mg PO Q4H PRN   


 


 Blood Culture x2 Reflex Set [OM.PC] Stat Oth  10/05/19 22:52 Ordered


 


 Saline Lock Insert [OM.PC] Stat Oth  10/05/19 22:12 Ordered








 Medication Orders





Acetaminophen (Tylenol)  650 mg PO Q6H PRN


   PRN Reason: Pain (mild 1-3)


Sodium Chloride (Normal Saline)  1,000 mls @ 75 mls/hr IV ASDIRECTED [facility]


   Last Admin: 10/06/19 01:39  Dose: 75 mls/hr


Vancomycin HCl (Vancomycin 1.5 Gm/300 Ml Bag)  300 mls @ 200 mls/hr IV Q8H [facility]


Oxycodone HCl (Oxycodone)  5 mg PO Q4H PRN


   PRN Reason: Pain (severe 7-10)


Sodium Chloride (Saline Flush)  10 ml FLUSH ASDIRECTED PRN


   PRN Reason: Keep Vein Open


Sodium Chloride (Saline Flush)  2.5 ml FLUSH ASDIRECTED PRN


   PRN Reason: Keep Vein Open


Vancomycin HCl (Pharmacy To Dose - Vancomycin)  1 dose .XX ASDIRECTED [facility]








Assessment/Plan Comment:: 





The patient is a 29-year-old gentleman who has been otherwise healthy up until 

the history of present illness. The patient had presented for outpatient 

failure and was admitted secondary to cellulitis of both lower extremities. 

This is improved somewhat overnight. The patient also will be continued on 

vancomycin with pharmacy to dose. Hemoglobin A1c was obtained and it was at 6.4

% and with the family's history of diabetes the patient will be kept on glucose 

management Accu-Cheks before meals 3 times a day and low dose sliding scale 

insulin. The patient's diet has been changed to appropriate diabetic diet. The 

patient also has arrangements for consultation with diabetic educator. He'll 

likely be able to manage his diabetes with metformin. The patient has been 

encouraged to ambulate. He also keep on DVT prophylaxis with the use of 

Lovenox. The patient should be appropriate for discharge in 1-2 days. Repeat 

laboratory studies have been ordered.





- Mortality Measure


Prognosis:: Good

## 2019-10-07 LAB
BUN SERPL-MCNC: 12 MG/DL (ref 7–18)
CHLORIDE SERPL-SCNC: 104 MMOL/L (ref 98–107)
CO2 SERPL-SCNC: 26 MMOL/L (ref 21–32)
GLUCOSE SERPL-MCNC: 109 MG/DL (ref 74–106)
POTASSIUM SERPL-SCNC: 4.3 MMOL/L (ref 3.5–5.1)
SODIUM SERPL-SCNC: 139 MMOL/L (ref 136–148)

## 2019-10-07 RX ADMIN — VANCOMYCIN SCH MLS/HR: 1.5 INJECTION, SOLUTION INTRAVENOUS at 10:41

## 2019-10-07 RX ADMIN — VANCOMYCIN SCH MLS/HR: 1.5 INJECTION, SOLUTION INTRAVENOUS at 01:38

## 2019-10-07 NOTE — [id number]
**Discharge Summary





- Hospital Course


Diagnosis: Stroke: No





- Discharge Data


Discharge Date: 10/07/19


Discharge Disposition: Home, Self-Care 01


Condition: Fair





- Referral to Home Health


Primary Care Physician: 


PCP None








- Discharge Diagnosis/Problem(s)


(1) Cellulitis


SNOMED Code(s): 529019014


   ICD Code: L03.90 - CELLULITIS, UNSPECIFIED   Status: Acute   Priority: High 

  


Qualifiers: 


   Site of cellulitis: extremity   Site of cellulitis of extremity: lower 

extremity   Laterality: unspecified laterality   Qualified Code(s): L03.119 - 

Cellulitis of unspecified part of limb   





(2) Leukocytosis


SNOMED Code(s): 789567777, 091879915


   ICD Code: D72.829 - ELEVATED WHITE BLOOD CELL COUNT, UNSPECIFIED   Status: 

Acute   Priority: High   


Qualifiers: 


   Leukocytosis type: bandemia   Qualified Code(s): D72.825 - Bandemia   





(3) Morbid obesity with BMI of 50.0-59.9, adult


SNOMED Code(s): 167075706, 72592249884513


   ICD Code: E66.01 - MORBID (SEVERE) OBESITY DUE TO EXCESS CALORIES; Z68.43 - 

BODY MASS INDEX (BMI) 50.0-59.9, ADULT   Status: Acute   Priority: High   





(4) Obstructive sleep apnea of adult


SNOMED Code(s): 6703733485426


   ICD Code: G47.33 - OBSTRUCTIVE SLEEP APNEA (ADULT) (PEDIATRIC)   Status: 

Chronic   Priority: High   





(5) Pre-diabetes


SNOMED Code(s): 650581673


   ICD Code: R73.03 - PREDIABETES   Status: Acute   Priority: High   





- Patient Summary/Data


Hospital Course: 





The patient is a 29-year-old gentleman who presented to the emergency 

department on October 4, 2019 with a concern of swelling and cellulitis of his 

lower extremities. The patient had been on antibiotics and he came back to the 

emergency department 2 days later with a complaint of worsening cellulitis. The 

patient was subsequently admitted for IV antibiotics due to outpatient failure. 

The patient was noted to have severe erythema circumferentially to both lower 

legs right greater than left as well as edema. The patient had been started on 

vancomycin 1 g IV every 12 hours and also Lasix 20 mg by mouth daily in the 

morning to help with some of the edema associated. The patient had continued to 

recover through the short course of hospitalization. The patient had elevation 

of his white blood cell count which had improved somewhat although the patient 

had continued to improve physically overall. By day of discharge the patient's 

white blood cell count was at 15,000. He had significantly less erythema and 

edema to his lower extremities. Is also noted that the patient's lactic acid 

was at 1.7. He was also noted that the patient did have episodes of 

hyperglycemia and he had a hemoglobin A1c at 6.2%. The patient had been 

ambulating without difficulty. At one point during physical examination the 

patient was noted to have heavy sonorous breathing with periods of witnessed 

apnea and this is thought secondary to his morbid obesity. As a result of this 

the patient has been recommended to have a follow-up sleep study ordered 

through his primary care physician. The patient had been tolerating his diet. 

He had been ambulating without difficulty. He said that he felt like he could 

go home. The patient had been discharged on metformin 500 mg by mouth daily, 

Zyvox 600 mg by mouth twice a day as he had failed outpatient treatment 

previously with other antibiotics, Lasix 20 mg by mouth daily. The patient is 

recommended to follow-up with his primary care physician with regards to weight 

loss strategies and also treatment of his type 2 diabetes. The patient has been 

recommended to continue with an appropriate heart healthy, ADA diet as 

tolerated. He is also to have activity as tolerated. The patient has been 

hemodynamically stable and he has been discharged from acute hospitalization 

with recommendations listed above.





- Patient Instructions


Diet: Diabetic Diet


Activity: As Tolerated


Notify Provider of: Fever, Increased Pain, Swelling and Redness





- Discharge Plan


*PRESCRIPTION DRUG MONITORING PROGRAM REVIEWED*: No


*COPY OF PRESCRIPTION DRUG MONITORING REPORT IN PATIENT LUCERO: No


Prescriptions/Med Rec: 


Furosemide [Lasix] 20 mg PO DAILY #30 tab


Linezolid [Zyvox] 600 mg PO Q12H #10 tab


metFORMIN [Glucophage] 500 mg PO DAILY #30 tablet


Home Medications: 


 Home Meds





Furosemide [Lasix] 20 mg PO DAILY #30 tab 10/07/19 [Rx]


Linezolid [Zyvox] 600 mg PO Q12H #10 tab 10/07/19 [Rx]


metFORMIN [Glucophage] 500 mg PO DAILY #30 tablet 10/07/19 [Rx]








Oxygen Therapy Mode: Room Air


Patient Handouts:  Furosemide tablets, Preventing Type 2 Diabetes Mellitus, 

Linezolid tablets, Cellulitis, Adult, Easy-to-Read, Metformin tablets


Referrals: 


[facility] [Outside]


Adonis Sullivan MD [Resident] - 10/18/19 2:00 pm





- Discharge Summary/Plan Comment


DC Time >30 min.: Yes





- General Info


Date of Service: 10/07/19


Admission Dx/Problem (Free Text: 


 Admission Diagnosis/Problem





Admission Diagnosis/Problem      Cellulitis, bilateral lower extremities, 

morbid obesity, lower extremity edema








Functional Status: Reports: Pain Controlled





- Review of Systems


General: Reports: No Symptoms


HEENT: Reports: No Symptoms


Pulmonary: Reports: No Symptoms


Cardiovascular: Reports: No Symptoms


Gastrointestinal: Reports: No Symptoms


Genitourinary: Reports: No Symptoms


Musculoskeletal: Reports: No Symptoms


Skin: Reports: No Symptoms


Neurological: Reports: No Symptoms


Psychiatric: Reports: No Symptoms





- Patient Data


Vitals - Most Recent: 


 Last Vital Signs











Temp  36.7 C   10/07/19 07:15


 


Pulse  106 H  10/07/19 07:15


 


Resp  17   10/07/19 07:15


 


BP  122/59 L  10/07/19 07:15


 


Pulse Ox  92 L  10/07/19 07:15











Weight - Most Recent: 151.817 kg


I&O - Last 24 hours: 


 Intake & Output











 10/06/19 10/07/19 10/07/19





 22:59 06:59 14:59


 


Intake Total 1700 1700 300


 


Output Total 1675 2100 


 


Balance 25 -400 300











Lab Results - Last 24 hrs: 


 Laboratory Results - last 24 hr











  10/06/19 10/06/19 10/07/19 Range/Units





  11:18 17:13 05:20 


 


WBC    15.42 H  (4.0-11.0)  K/uL


 


RBC    4.81  (4.50-5.90)  M/uL


 


Hgb    12.5 L  (13.0-17.0)  g/dL


 


Hct    38.9  (38.0-50.0)  %


 


MCV    80.9  (80.0-98.0)  fL


 


MCH    26.0 L  (27.0-32.0)  pg


 


MCHC    32.1  (31.0-37.0)  g/dL


 


RDW Std Deviation    45.9  (28.0-62.0)  fl


 


RDW Coeff of Estephania    16 H  (11.0-15.0)  %


 


Plt Count    407 H  (150-400)  K/uL


 


MPV    10.30  (7.40-12.00)  fL


 


Neut % (Auto)    66.6  (48.0-80.0)  %


 


Lymph % (Auto)    22.7  (16.0-40.0)  %


 


Mono % (Auto)    7.3  (0.0-15.0)  %


 


Eos % (Auto)    3.1  (0.0-7.0)  %


 


Baso % (Auto)    0.3  (0.0-1.5)  %


 


Neut # (Auto)    10.3 H  (1.4-5.7)  K/uL


 


Lymph # (Auto)    3.5 H  (0.6-2.4)  K/uL


 


Mono # (Auto)    1.1 H  (0.0-0.8)  K/uL


 


Eos # (Auto)    0.5  (0.0-0.7)  K/uL


 


Baso # (Auto)    0.0  (0.0-0.1)  K/uL


 


Nucleated RBC %    0.0  /100WBC


 


Nucleated RBCs #    0  K/uL


 


Sodium     (136-148)  mmol/L


 


Potassium     (3.5-5.1)  mmol/L


 


Chloride     ()  mmol/L


 


Carbon Dioxide     (21.0-32.0)  mmol/L


 


BUN     (7.0-18.0)  mg/dL


 


Creatinine     (0.8-1.3)  mg/dL


 


Est Cr Clr Drug Dosing     mL/min


 


Estimated GFR (MDRD)     ml/min


 


Glucose     ()  mg/dL


 


POC Glucose  88  94   ()  mg/dL


 


Calcium     (8.5-10.1)  mg/dL


 


Total Bilirubin     (0.2-1.0)  mg/dL


 


AST     (15-37)  IU/L


 


ALT     (14-63)  IU/L


 


Alkaline Phosphatase     ()  U/L


 


Total Protein     (6.4-8.2)  g/dL


 


Albumin     (3.4-5.0)  g/dL


 


Globulin     (2.6-4.0)  g/dL


 


Albumin/Globulin Ratio     (0.9-1.6)  


 


Vancomycin Trough     (5.0-10.0)  ug/mL














  10/07/19 10/07/19 10/07/19 Range/Units





  05:20 06:07 09:16 


 


WBC     (4.0-11.0)  K/uL


 


RBC     (4.50-5.90)  M/uL


 


Hgb     (13.0-17.0)  g/dL


 


Hct     (38.0-50.0)  %


 


MCV     (80.0-98.0)  fL


 


MCH     (27.0-32.0)  pg


 


MCHC     (31.0-37.0)  g/dL


 


RDW Std Deviation     (28.0-62.0)  fl


 


RDW Coeff of Estephania     (11.0-15.0)  %


 


Plt Count     (150-400)  K/uL


 


MPV     (7.40-12.00)  fL


 


Neut % (Auto)     (48.0-80.0)  %


 


Lymph % (Auto)     (16.0-40.0)  %


 


Mono % (Auto)     (0.0-15.0)  %


 


Eos % (Auto)     (0.0-7.0)  %


 


Baso % (Auto)     (0.0-1.5)  %


 


Neut # (Auto)     (1.4-5.7)  K/uL


 


Lymph # (Auto)     (0.6-2.4)  K/uL


 


Mono # (Auto)     (0.0-0.8)  K/uL


 


Eos # (Auto)     (0.0-0.7)  K/uL


 


Baso # (Auto)     (0.0-0.1)  K/uL


 


Nucleated RBC %     /100WBC


 


Nucleated RBCs #     K/uL


 


Sodium  139    (136-148)  mmol/L


 


Potassium  4.3    (3.5-5.1)  mmol/L


 


Chloride  104    ()  mmol/L


 


Carbon Dioxide  26.0    (21.0-32.0)  mmol/L


 


BUN  12    (7.0-18.0)  mg/dL


 


Creatinine  0.8    (0.8-1.3)  mg/dL


 


Est Cr Clr Drug Dosing  116.14    mL/min


 


Estimated GFR (MDRD)  > 60.0    ml/min


 


Glucose  109 H    ()  mg/dL


 


POC Glucose   108   ()  mg/dL


 


Calcium  8.6    (8.5-10.1)  mg/dL


 


Total Bilirubin  0.5    (0.2-1.0)  mg/dL


 


AST  26    (15-37)  IU/L


 


ALT  60    (14-63)  IU/L


 


Alkaline Phosphatase  78    ()  U/L


 


Total Protein  8.1    (6.4-8.2)  g/dL


 


Albumin  3.3 L    (3.4-5.0)  g/dL


 


Globulin  4.8 H    (2.6-4.0)  g/dL


 


Albumin/Globulin Ratio  0.7 L    (0.9-1.6)  


 


Vancomycin Trough    12.1 H  (5.0-10.0)  ug/mL











Med Orders - Current: 


 Current Medications





Acetaminophen (Tylenol)  650 mg PO Q6H PRN


   PRN Reason: Pain (mild 1-3)


   Last Admin: 10/06/19 17:30 Dose:  650 mg


Enoxaparin Sodium (Lovenox)  40 mg SUBCUT Q24H [facility]


   Last Admin: 10/07/19 08:17 Dose:  40 mg


Furosemide (Lasix)  20 mg PO DAILY [facility]


   Last Admin: 10/07/19 08:19 Dose:  20 mg


Vancomycin HCl (Vancomycin 1.5 Gm/300 Ml Bag)  300 mls @ 200 mls/hr IV Q8H [facility]


   Last Admin: 10/07/19 10:41 Dose:  200 mls/hr


Insulin Aspart (Novolog)  0 unit SUBCUT TIDAC [facility]; Protocol


   Last Admin: 10/07/19 06:29 Dose:  Not Given


Oxycodone HCl (Oxycodone)  5 mg PO Q4H PRN


   PRN Reason: Pain (severe 7-10)


Sodium Chloride (Saline Flush)  10 ml FLUSH ASDIRECTED PRN


   PRN Reason: Keep Vein Open


Sodium Chloride (Saline Flush)  2.5 ml FLUSH ASDIRECTED PRN


   PRN Reason: Keep Vein Open


Vancomycin HCl (Pharmacy To Dose - Vancomycin)  1 dose .XX ASDIRECTED [facility]





Discontinued Medications





Vancomycin HCl 1 gm/ Sodium (Chloride)  250 mls @ 166 mls/hr IV ONETIME ONE


   Stop: 10/06/19 01:26


   Last Admin: 10/06/19 00:11 Dose:  166 mls/hr


Sodium Chloride (Normal Saline)  1,000 mls @ 75 mls/hr IV ASDIRECTED [facility]


   Last Admin: 10/06/19 01:39 Dose:  75 mls/hr


Vancomycin HCl 500 mg/ Sodium (Chloride)  100 mls @ 100 mls/hr IV NOW ONE


   Stop: 10/06/19 02:44


   Last Admin: 10/06/19 02:20 Dose:  100 mls/hr


Vancomycin HCl (Vancomycin 1.5 Gm/300 Ml Bag)  300 mls @ 200 mls/hr IV Q12H [facility]


Ketorolac Tromethamine (Toradol)  60 mg IM ONETIME ONE


   Stop: 10/05/19 21:41


   Last Admin: 10/05/19 22:31 Dose:  Not Given


Ketorolac Tromethamine (Toradol)  30 mg IVPUSH ONETIME ONE


   Stop: 10/05/19 22:13


   Last Admin: 10/05/19 22:15 Dose:  30 mg











- Exam


Quality Assessment: Denies: Supplemental Oxygen


General: Reports: Alert, Oriented, Cooperative, No Acute Distress


HEENT: Reports: Pupils Equal, Pupils Reactive, EOMI, Mucous Membr. Moist/Pink


Neck: Reports: Supple, Trachea Midline


Lungs: Reports: Clear to Auscultation, Normal Respiratory Effort


Cardiovascular: Reports: Regular Rate, Regular Rhythm


GI/Abdominal Exam: Normal Bowel Sounds, Soft, Non-Tender, No Distention, Other (

Unable to confirm details secondary to the patient's body habitus.)


Back Exam: Reports: Normal Inspection, Full Range of Motion


Extremities: Normal Inspection, No Pedal Edema


Skin: Reports: Warm, Dry, Intact, Other (Areas of rubor lower extremities 

markedly decreased with very little erythema.)


Neurological: Reports: No New Focal Deficit, Normal Gait, Normal Speech


Psy/Mental Status: Reports: Alert, Normal Affect, Normal Mood